# Patient Record
Sex: MALE | Race: WHITE
[De-identification: names, ages, dates, MRNs, and addresses within clinical notes are randomized per-mention and may not be internally consistent; named-entity substitution may affect disease eponyms.]

---

## 2018-04-06 ENCOUNTER — HOSPITAL ENCOUNTER (EMERGENCY)
Dept: HOSPITAL 54 - ER | Age: 38
Discharge: HOME | End: 2018-04-06
Payer: COMMERCIAL

## 2018-04-06 VITALS — DIASTOLIC BLOOD PRESSURE: 77 MMHG | SYSTOLIC BLOOD PRESSURE: 132 MMHG

## 2018-04-06 VITALS — BODY MASS INDEX: 23.62 KG/M2 | HEIGHT: 70 IN | WEIGHT: 165 LBS

## 2018-04-06 DIAGNOSIS — G43.909: Primary | ICD-10-CM

## 2018-04-06 LAB
BASOPHILS # BLD AUTO: 0.1 /CMM (ref 0–0.2)
BASOPHILS NFR BLD AUTO: 0.6 % (ref 0–2)
BUN SERPL-MCNC: 9 MG/DL (ref 7–18)
CALCIUM SERPL-MCNC: 8.9 MG/DL (ref 8.5–10.1)
CHLORIDE SERPL-SCNC: 101 MMOL/L (ref 98–107)
CO2 SERPL-SCNC: 29 MMOL/L (ref 21–32)
CREAT SERPL-MCNC: 1.2 MG/DL (ref 0.6–1.3)
EOSINOPHIL NFR BLD AUTO: 0.2 % (ref 0–6)
GLUCOSE SERPL-MCNC: 108 MG/DL (ref 74–106)
HCT VFR BLD AUTO: 45 % (ref 39–51)
HGB BLD-MCNC: 15.6 G/DL (ref 13.5–17.5)
LYMPHOCYTES NFR BLD AUTO: 2.7 /CMM (ref 0.8–4.8)
LYMPHOCYTES NFR BLD AUTO: 20.7 % (ref 20–44)
MCHC RBC AUTO-ENTMCNC: 35 G/DL (ref 31–36)
MCV RBC AUTO: 88 FL (ref 80–96)
MONOCYTES NFR BLD AUTO: 1.4 /CMM (ref 0.1–1.3)
MONOCYTES NFR BLD AUTO: 11 % (ref 2–12)
NEUTROPHILS # BLD AUTO: 8.9 /CMM (ref 1.8–8.9)
NEUTROPHILS NFR BLD AUTO: 67.5 % (ref 43–81)
PLATELET # BLD AUTO: 328 /CMM (ref 150–450)
POTASSIUM SERPL-SCNC: 3.9 MMOL/L (ref 3.5–5.1)
RBC # BLD AUTO: 5.13 MIL/UL (ref 4.5–6)
RDW COEFFICIENT OF VARIATION: 11.6 (ref 11.5–15)
SODIUM SERPL-SCNC: 136 MMOL/L (ref 136–145)
WBC NRBC COR # BLD AUTO: 13.1 K/UL (ref 4.3–11)

## 2018-04-06 PROCEDURE — A4606 OXYGEN PROBE USED W OXIMETER: HCPCS

## 2018-04-06 PROCEDURE — Z7610: HCPCS

## 2018-04-06 NOTE — NUR
Headaches, bodyaches, low-grade fever, chills, abd discomfort x 5 days. SEEN BY 
MD FOR EVAL. VSS. FAMILY MEMBER REMAINS AT BS. SAFETY AND COMFORT MEASURES 
PROVIDED. WILL MONITOR.